# Patient Record
Sex: FEMALE | Race: WHITE | Employment: FULL TIME | URBAN - NONMETROPOLITAN AREA
[De-identification: names, ages, dates, MRNs, and addresses within clinical notes are randomized per-mention and may not be internally consistent; named-entity substitution may affect disease eponyms.]

---

## 2018-01-17 ENCOUNTER — HOSPITAL ENCOUNTER (EMERGENCY)
Age: 23
Discharge: HOME | End: 2018-01-17
Payer: COMMERCIAL

## 2018-01-17 VITALS
RESPIRATION RATE: 16 BRPM | TEMPERATURE: 97.7 F | WEIGHT: 135 LBS | OXYGEN SATURATION: 100 % | SYSTOLIC BLOOD PRESSURE: 126 MMHG | HEART RATE: 60 BPM | DIASTOLIC BLOOD PRESSURE: 59 MMHG
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
## 2018-01-17 DIAGNOSIS — S61.451A DOG BITE OF RIGHT HAND, INITIAL ENCOUNTER: Primary | ICD-10-CM

## 2018-01-17 PROCEDURE — 90471 IMMUNIZATION ADMIN: CPT | Performed by: NURSE PRACTITIONER

## 2018-01-17 PROCEDURE — 99213 OFFICE O/P EST LOW 20 MIN: CPT

## 2018-01-17 PROCEDURE — 99213 OFFICE O/P EST LOW 20 MIN: CPT | Performed by: NURSE PRACTITIONER

## 2018-01-17 PROCEDURE — 6360000002 HC RX W HCPCS: Performed by: NURSE PRACTITIONER

## 2018-01-17 PROCEDURE — 90715 TDAP VACCINE 7 YRS/> IM: CPT | Performed by: NURSE PRACTITIONER

## 2018-01-17 PROCEDURE — A6446 CONFORM BAND S W>=3" <5"/YD: HCPCS

## 2018-01-17 PROCEDURE — 6370000000 HC RX 637 (ALT 250 FOR IP): Performed by: NURSE PRACTITIONER

## 2018-01-17 RX ORDER — DOXYCYCLINE HYCLATE 100 MG
100 TABLET ORAL 2 TIMES DAILY
Qty: 20 TABLET | Refills: 0 | Status: SHIPPED | OUTPATIENT
Start: 2018-01-17 | End: 2018-01-27

## 2018-01-17 RX ORDER — GINSENG 100 MG
CAPSULE ORAL ONCE
Status: COMPLETED | OUTPATIENT
Start: 2018-01-17 | End: 2018-01-17

## 2018-01-17 RX ADMIN — BACITRACIN: 500 OINTMENT TOPICAL at 17:21

## 2018-01-17 RX ADMIN — TETANUS TOXOID, REDUCED DIPHTHERIA TOXOID AND ACELLULAR PERTUSSIS VACCINE, ADSORBED 0.5 ML: 5; 2.5; 8; 8; 2.5 SUSPENSION INTRAMUSCULAR at 17:17

## 2018-01-17 ASSESSMENT — PAIN DESCRIPTION - ORIENTATION: ORIENTATION: RIGHT

## 2018-01-17 ASSESSMENT — PAIN SCALES - GENERAL: PAINLEVEL_OUTOF10: 8

## 2018-01-17 ASSESSMENT — ENCOUNTER SYMPTOMS: ROS SKIN COMMENTS: RIGHT HAND

## 2018-01-17 ASSESSMENT — PAIN DESCRIPTION - LOCATION: LOCATION: HAND

## 2018-01-17 NOTE — ED PROVIDER NOTES
[address] [facility] [address] [address] [facility] URGENT CARE  Urgent Care Encounter      CHIEF COMPLAINT       Chief Complaint   Patient presents with   [address]     right hand       Nurses Notes reviewed and I agree except as noted in the HPI. HISTORY OF PRESENT ILLNESS   Elvin Sandoval is a 23 y.o. female who presents Playing with her dog at home and was accidently bit in her dorsum right hand. The history is provided by the patient. No  was used. Animal Bite   Contact animal:  Dog  Location:  Hand  Hand injury location:  R hand and dorsum of R hand  Time since incident:  5 hours  Pain details:     Quality:  Aching    Severity:  Moderate    Timing:  Constant    Progression:  Worsening  Incident location:  Home  Provoked: playing with dog with a toy. Notifications:  None  Animal's rabies vaccination status:  Out of date  Animal in possession: yes    Tetanus status:  Out of date  Relieved by: peroxide & cold water. Worsened by: Activity  Ineffective treatments:  OTC medications  Associated symptoms: no fever, no numbness and no swelling          REVIEW OF SYSTEMS     Review of Systems   Constitutional: Positive for activity change. Negative for fever. Skin: Positive for wound. Right hand   Neurological: Negative for numbness. All other systems reviewed and are negative. PAST MEDICAL HISTORY   History reviewed. No pertinent past medical history. SURGICAL HISTORY     Patient  has no past surgical history on file. CURRENT MEDICATIONS       Previous Medications    MULTIPLE VITAMINS-MINERALS (THERAPEUTIC MULTIVITAMIN-MINERALS) TABLET    Take 1 tablet by mouth daily    NORGESTIM-ETH ESTRAD TRIPHASIC (TRI-SPRINTEC PO)    Take by mouth    PAPAYA ENZYME CHEW    Take by mouth       ALLERGIES     Patient is is allergic to amoxicillin. FAMILY HISTORY     Patient's family history includes Other in her mother. SOCIAL HISTORY     Patient  reports that she has never smoked.  She has never used smokeless tobacco. She reports that she does not drink alcohol or use drugs. PHYSICAL EXAM     ED TRIAGE VITALS  BP: (!) 126/59, Temp: 97.7 °F (36.5 °C), Pulse: 60, Resp: 16, SpO2: 100 %  Physical Exam   Constitutional: She is oriented to person, place, and time. She appears well-developed and well-nourished. No distress. Cardiovascular: Normal rate, regular rhythm and normal heart sounds. Pulmonary/Chest: Effort normal and breath sounds normal.   Musculoskeletal: Normal range of motion. She exhibits tenderness. Neurological: She is alert and oriented to person, place, and time. Skin: Skin is warm and dry. Small wound dorsum right hand mid first metacarpal.  Full range of motion   Psychiatric: She has a normal mood and affect. Her behavior is normal. Thought content normal.   Nursing note and vitals reviewed. DIAGNOSTIC RESULTS   Labs:No results found for this visit on 01/17/18. IMAGING:  No orders to display     URGENT CARE COURSE:     Vitals:    01/17/18 1650   BP: (!) 126/59   Pulse: 60   Resp: 16   Temp: 97.7 °F (36.5 °C)   SpO2: 100%   Weight: 135 lb (61.2 kg)       Medications   Tetanus-Diphth-Acell Pertussis (BOOSTRIX) injection 0.5 mL (0.5 mLs Intramuscular Given 1/17/18 1717)   bacitracin ointment ( Topical Given 1/17/18 1721)   Dog bite form completed by staff  PROCEDURES:  None  FINAL IMPRESSION      1. Dog bite of right hand, initial encounter        DISPOSITION/PLAN   DISPOSITION Decision To Discharge 01/17/2018 05:16:18 PM    PATIENT REFERRED TO:  joanna [address]  [phone]    In 1 week  As needed signs of infection    DISCHARGE MEDICATIONS:  New Prescriptions    DOXYCYCLINE HYCLATE (VIBRA-TABS) 100 MG TABLET    Take 1 tablet by mouth 2 times daily for 10 days    MUPIROCIN (BACTROBAN) 2 % OINTMENT    Apply topically 2 times daily.      Current Discharge Medication List          LEONEL Gaspar [place]  01/17/18

## 2018-01-17 NOTE — ED TRIAGE NOTES
Alfred 1 c/o \" My dog bit mny hand while we were playing. I cleaned it with water and peroxide and bandaged it I need tetanus shot,  Dog last had rabies vaccine 4 or more years ago\".   Dressing dry and intact

## 2019-01-31 ENCOUNTER — HOSPITAL ENCOUNTER (EMERGENCY)
Age: 24
Discharge: HOME | End: 2019-01-31
Payer: COMMERCIAL

## 2019-01-31 VITALS
TEMPERATURE: 97.9 F | BODY MASS INDEX: 26.68 KG/M2 | SYSTOLIC BLOOD PRESSURE: 113 MMHG | HEIGHT: 62 IN | RESPIRATION RATE: 16 BRPM | DIASTOLIC BLOOD PRESSURE: 72 MMHG | OXYGEN SATURATION: 100 % | WEIGHT: 145 LBS | HEART RATE: 75 BPM

## 2019-01-31 DIAGNOSIS — J02.9 VIRAL PHARYNGITIS: Primary | ICD-10-CM

## 2019-01-31 LAB
GROUP A STREP CULTURE, REFLEX: NEGATIVE
REFLEX THROAT C + S: NORMAL

## 2019-01-31 PROCEDURE — 99213 OFFICE O/P EST LOW 20 MIN: CPT

## 2019-01-31 PROCEDURE — 99213 OFFICE O/P EST LOW 20 MIN: CPT | Performed by: NURSE PRACTITIONER

## 2019-01-31 PROCEDURE — 87070 CULTURE OTHR SPECIMN AEROBIC: CPT

## 2019-01-31 ASSESSMENT — ENCOUNTER SYMPTOMS
DIARRHEA: 0
SINUS PRESSURE: 0
SHORTNESS OF BREATH: 0
SORE THROAT: 1
CHEST TIGHTNESS: 0
ABDOMINAL PAIN: 0
NAUSEA: 0
VOMITING: 0
RHINORRHEA: 0

## 2019-02-02 LAB — THROAT/NOSE CULTURE: NORMAL
